# Patient Record
Sex: MALE | URBAN - METROPOLITAN AREA
[De-identification: names, ages, dates, MRNs, and addresses within clinical notes are randomized per-mention and may not be internally consistent; named-entity substitution may affect disease eponyms.]

---

## 2019-10-10 ENCOUNTER — APPOINTMENT (RX ONLY)
Dept: URBAN - METROPOLITAN AREA CLINIC 110 | Facility: CLINIC | Age: 39
Setting detail: DERMATOLOGY
End: 2019-10-10

## 2019-10-10 DIAGNOSIS — B07.8 OTHER VIRAL WARTS: ICD-10-CM

## 2019-10-10 DIAGNOSIS — L82.1 OTHER SEBORRHEIC KERATOSIS: ICD-10-CM

## 2019-10-10 PROBLEM — E13.9 OTHER SPECIFIED DIABETES MELLITUS WITHOUT COMPLICATIONS: Status: ACTIVE | Noted: 2019-10-10

## 2019-10-10 PROBLEM — F41.9 ANXIETY DISORDER, UNSPECIFIED: Status: ACTIVE | Noted: 2019-10-10

## 2019-10-10 PROBLEM — F32.9 MAJOR DEPRESSIVE DISORDER, SINGLE EPISODE, UNSPECIFIED: Status: ACTIVE | Noted: 2019-10-10

## 2019-10-10 PROCEDURE — ? LIQUID NITROGEN (COSMETIC)

## 2019-10-10 PROCEDURE — 99242 OFF/OP CONSLTJ NEW/EST SF 20: CPT | Mod: 25

## 2019-10-10 PROCEDURE — 11301 SHAVE SKIN LESION 0.6-1.0 CM: CPT

## 2019-10-10 PROCEDURE — ? DIAGNOSIS COMMENT

## 2019-10-10 PROCEDURE — ? COUNSELING

## 2019-10-10 PROCEDURE — ? SHAVE REMOVAL

## 2019-10-10 ASSESSMENT — LOCATION DETAILED DESCRIPTION DERM
LOCATION DETAILED: LEFT ANTERIOR PROXIMAL THIGH
LOCATION DETAILED: RIGHT ANTERIOR PROXIMAL THIGH
LOCATION DETAILED: RIGHT ANTERIOR MEDIAL PROXIMAL THIGH
LOCATION DETAILED: GENITALIA

## 2019-10-10 ASSESSMENT — LOCATION SIMPLE DESCRIPTION DERM
LOCATION SIMPLE: RIGHT THIGH
LOCATION SIMPLE: LEFT THIGH
LOCATION SIMPLE: GENITALIA

## 2019-10-10 ASSESSMENT — LOCATION ZONE DERM
LOCATION ZONE: GENITALIA
LOCATION ZONE: LEG

## 2019-10-10 ASSESSMENT — PAIN INTENSITY VAS: HOW INTENSE IS YOUR PAIN 0 BEING NO PAIN, 10 BEING THE MOST SEVERE PAIN POSSIBLE?: NO PAIN

## 2019-10-10 NOTE — PROCEDURE: SHAVE REMOVAL
Path Notes (To The Dermatopathologist): Please check margins.
Add Variable For Additional Medical Justification: No
Consent was obtained from the patient. The risks and benefits to therapy were discussed in detail. Specifically, the risks of infection, scarring, bleeding, prolonged wound healing, incomplete removal, allergy to anesthesia, nerve injury and recurrence were addressed. Prior to the procedure, the treatment site was clearly identified and confirmed by the patient. All components of Universal Protocol/PAUSE Rule completed.
Billing Type: Third-Party Bill
X Size Of Lesion In Cm (Optional): 0
Lab: [id number]
Wound Care: Petrolatum
Medical Necessity Information: It is in your best interest to select a reason for this procedure from the list below. All of these items fulfill various CMS LCD requirements except the new and changing color options.
Was A Bandage Applied: Yes
Anesthesia Type: 1% lidocaine with epinephrine
Biopsy Method: Dermablade
Post-Care Instructions: I reviewed with the patient in detail post-care instructions. Patient is to keep the biopsy site dry overnight, and then apply bacitracin twice daily until healed. Patient may apply hydrogen peroxide soaks to remove any crusting.
Medical Necessity Clause: This procedure was medically necessary because the lesion that was treated was:
Notification Instructions: Patient will be notified of biopsy results. However, patient instructed to call the office if not contacted within 2 weeks.
Hemostasis: Drysol
Size Of Lesion In Cm (Required): 0.7
Detail Level: Detailed

## 2020-01-10 ENCOUNTER — APPOINTMENT (RX ONLY)
Dept: URBAN - METROPOLITAN AREA CLINIC 88 | Facility: CLINIC | Age: 40
Setting detail: DERMATOLOGY
End: 2020-01-10

## 2020-01-10 DIAGNOSIS — A63.0 ANOGENITAL (VENEREAL) WARTS: ICD-10-CM

## 2020-01-10 DIAGNOSIS — D22 MELANOCYTIC NEVI: ICD-10-CM

## 2020-01-10 PROBLEM — D22.4 MELANOCYTIC NEVI OF SCALP AND NECK: Status: ACTIVE | Noted: 2020-01-10

## 2020-01-10 PROCEDURE — 54065 DESTRUCTION PENIS LESION(S): CPT

## 2020-01-10 PROCEDURE — 99202 OFFICE O/P NEW SF 15 MIN: CPT | Mod: 25

## 2020-01-10 PROCEDURE — ? EXTENSIVE DESTRUCTION

## 2020-01-10 PROCEDURE — ? COUNSELING

## 2020-01-10 ASSESSMENT — LOCATION DETAILED DESCRIPTION DERM
LOCATION DETAILED: DORSAL GLANS
LOCATION DETAILED: LEFT ANTERIOR SCROTUM
LOCATION DETAILED: RIGHT ANTERIOR SCROTUM
LOCATION DETAILED: LEFT DORSAL SHAFT OF PENIS
LOCATION DETAILED: LEFT CLAVICULAR NECK

## 2020-01-10 ASSESSMENT — LOCATION SIMPLE DESCRIPTION DERM
LOCATION SIMPLE: LEFT ANTERIOR NECK
LOCATION SIMPLE: SCROTUM
LOCATION SIMPLE: PENIS

## 2020-01-10 ASSESSMENT — LOCATION ZONE DERM
LOCATION ZONE: NECK
LOCATION ZONE: PENIS
LOCATION ZONE: GENITALIA

## 2020-01-10 NOTE — PROCEDURE: EXTENSIVE DESTRUCTION
Method: liquid nitrogen
Post-Care Instructions: I reviewed with the patient in detail post-care instructions. Patient is to wear sunprotection, and avoid picking at any of the treated lesions. Pt may apply Vaseline to crusted or scabbing areas.
Extensive Clause: This destruction was deemed extensive because of the
Anesthesia Volume In Cc: 0.5
Render Post-Care Instructions In Note?: no
Detail Level: Zone
Consent: The patient's consent was obtained including but not limited to risks of crusting, scabbing, blistering, scarring, darker or lighter pigmentary change, recurrence, incomplete removal and infection.

## 2020-01-24 ENCOUNTER — APPOINTMENT (RX ONLY)
Dept: URBAN - METROPOLITAN AREA CLINIC 88 | Facility: CLINIC | Age: 40
Setting detail: DERMATOLOGY
End: 2020-01-24

## 2020-01-24 DIAGNOSIS — A63.0 ANOGENITAL (VENEREAL) WARTS: ICD-10-CM

## 2020-01-24 PROCEDURE — 54065 DESTRUCTION PENIS LESION(S): CPT

## 2020-01-24 PROCEDURE — ? EXTENSIVE DESTRUCTION

## 2020-01-24 PROCEDURE — ? COUNSELING

## 2020-01-24 ASSESSMENT — LOCATION DETAILED DESCRIPTION DERM
LOCATION DETAILED: LEFT DORSAL SHAFT OF PENIS
LOCATION DETAILED: LEFT ANTERIOR SCROTUM
LOCATION DETAILED: RIGHT ANTERIOR SCROTUM
LOCATION DETAILED: DORSAL GLANS

## 2020-01-24 ASSESSMENT — LOCATION ZONE DERM
LOCATION ZONE: PENIS
LOCATION ZONE: GENITALIA

## 2020-01-24 ASSESSMENT — LOCATION SIMPLE DESCRIPTION DERM
LOCATION SIMPLE: PENIS
LOCATION SIMPLE: SCROTUM

## 2020-01-24 NOTE — PROCEDURE: EXTENSIVE DESTRUCTION
Anesthesia Volume In Cc: 0.5
Method: liquid nitrogen
Consent: The patient's consent was obtained including but not limited to risks of crusting, scabbing, blistering, scarring, darker or lighter pigmentary change, recurrence, incomplete removal and infection.
Render Post-Care Instructions In Note?: no
Extensive Clause: This destruction was deemed extensive because of the
Detail Level: Zone
Post-Care Instructions: I reviewed with the patient in detail post-care instructions. Patient is to wear sunprotection, and avoid picking at any of the treated lesions. Pt may apply Vaseline to crusted or scabbing areas.

## 2020-02-14 ENCOUNTER — APPOINTMENT (RX ONLY)
Dept: URBAN - METROPOLITAN AREA CLINIC 88 | Facility: CLINIC | Age: 40
Setting detail: DERMATOLOGY
End: 2020-02-14

## 2020-02-14 DIAGNOSIS — A63.0 ANOGENITAL (VENEREAL) WARTS: ICD-10-CM

## 2020-02-14 PROCEDURE — ? COUNSELING

## 2020-02-14 PROCEDURE — ? EXTENSIVE DESTRUCTION

## 2020-02-14 PROCEDURE — 54065 DESTRUCTION PENIS LESION(S): CPT

## 2020-02-14 ASSESSMENT — LOCATION DETAILED DESCRIPTION DERM
LOCATION DETAILED: RIGHT ANTERIOR SCROTUM
LOCATION DETAILED: LEFT ANTERIOR SCROTUM
LOCATION DETAILED: LEFT DORSAL SHAFT OF PENIS
LOCATION DETAILED: DORSAL GLANS

## 2020-02-14 ASSESSMENT — LOCATION ZONE DERM
LOCATION ZONE: GENITALIA
LOCATION ZONE: PENIS

## 2020-02-14 ASSESSMENT — LOCATION SIMPLE DESCRIPTION DERM
LOCATION SIMPLE: SCROTUM
LOCATION SIMPLE: PENIS

## 2020-02-14 NOTE — PROCEDURE: EXTENSIVE DESTRUCTION
Detail Level: Zone
Anesthesia Volume In Cc: 0.5
Render Post-Care Instructions In Note?: no
Post-Care Instructions: I reviewed with the patient in detail post-care instructions. Patient is to wear sunprotection, and avoid picking at any of the treated lesions. Pt may apply Vaseline to crusted or scabbing areas.
Extensive Clause: This destruction was deemed extensive because of the
Consent: The patient's consent was obtained including but not limited to risks of crusting, scabbing, blistering, scarring, darker or lighter pigmentary change, recurrence, incomplete removal and infection.
Method: liquid nitrogen

## 2020-03-03 ENCOUNTER — APPOINTMENT (RX ONLY)
Dept: URBAN - METROPOLITAN AREA CLINIC 88 | Facility: CLINIC | Age: 40
Setting detail: DERMATOLOGY
End: 2020-03-03

## 2020-03-03 DIAGNOSIS — A63.0 ANOGENITAL (VENEREAL) WARTS: ICD-10-CM

## 2020-03-03 PROCEDURE — ? EXTENSIVE DESTRUCTION

## 2020-03-03 PROCEDURE — ? COUNSELING

## 2020-03-03 PROCEDURE — 54065 DESTRUCTION PENIS LESION(S): CPT

## 2020-03-03 ASSESSMENT — LOCATION DETAILED DESCRIPTION DERM
LOCATION DETAILED: LEFT ANTERIOR SCROTUM
LOCATION DETAILED: LEFT DORSAL SHAFT OF PENIS
LOCATION DETAILED: RIGHT ANTERIOR SCROTUM
LOCATION DETAILED: DORSAL GLANS

## 2020-03-03 ASSESSMENT — LOCATION ZONE DERM
LOCATION ZONE: PENIS
LOCATION ZONE: GENITALIA

## 2020-03-03 ASSESSMENT — LOCATION SIMPLE DESCRIPTION DERM
LOCATION SIMPLE: SCROTUM
LOCATION SIMPLE: PENIS

## 2020-03-03 NOTE — PROCEDURE: EXTENSIVE DESTRUCTION
Consent: The patient's consent was obtained including but not limited to risks of crusting, scabbing, blistering, scarring, darker or lighter pigmentary change, recurrence, incomplete removal and infection.
Anesthesia Volume In Cc: 0.5
Render Post-Care Instructions In Note?: no
Method: liquid nitrogen
Detail Level: Zone
Post-Care Instructions: I reviewed with the patient in detail post-care instructions. Patient is to wear sunprotection, and avoid picking at any of the treated lesions. Pt may apply Vaseline to crusted or scabbing areas.
Extensive Clause: This destruction was deemed extensive because of the

## 2020-04-28 ENCOUNTER — APPOINTMENT (RX ONLY)
Dept: URBAN - METROPOLITAN AREA CLINIC 88 | Facility: CLINIC | Age: 40
Setting detail: DERMATOLOGY
End: 2020-04-28

## 2020-04-28 DIAGNOSIS — A63.0 ANOGENITAL (VENEREAL) WARTS: ICD-10-CM

## 2020-04-28 PROCEDURE — 54065 DESTRUCTION PENIS LESION(S): CPT

## 2020-04-28 PROCEDURE — ? COUNSELING

## 2020-04-28 PROCEDURE — ? EXTENSIVE DESTRUCTION

## 2020-04-28 ASSESSMENT — LOCATION SIMPLE DESCRIPTION DERM
LOCATION SIMPLE: SCROTUM
LOCATION SIMPLE: PENIS

## 2020-04-28 ASSESSMENT — LOCATION DETAILED DESCRIPTION DERM
LOCATION DETAILED: RIGHT ANTERIOR SCROTUM
LOCATION DETAILED: DORSAL GLANS
LOCATION DETAILED: LEFT ANTERIOR SCROTUM
LOCATION DETAILED: LEFT DORSAL SHAFT OF PENIS

## 2020-04-28 ASSESSMENT — LOCATION ZONE DERM
LOCATION ZONE: PENIS
LOCATION ZONE: GENITALIA

## 2020-04-28 NOTE — PROCEDURE: EXTENSIVE DESTRUCTION
Extensive Clause: This destruction was deemed extensive because of the
Render Post-Care Instructions In Note?: no
Detail Level: Zone
Anesthesia Volume In Cc: 0.5
Consent: The patient's consent was obtained including but not limited to risks of crusting, scabbing, blistering, scarring, darker or lighter pigmentary change, recurrence, incomplete removal and infection.
Post-Care Instructions: I reviewed with the patient in detail post-care instructions. Patient is to wear sunprotection, and avoid picking at any of the treated lesions. Pt may apply Vaseline to crusted or scabbing areas.
Method: liquid nitrogen

## 2020-05-12 ENCOUNTER — APPOINTMENT (RX ONLY)
Dept: URBAN - METROPOLITAN AREA CLINIC 88 | Facility: CLINIC | Age: 40
Setting detail: DERMATOLOGY
End: 2020-05-12

## 2020-05-12 DIAGNOSIS — A63.0 ANOGENITAL (VENEREAL) WARTS: ICD-10-CM

## 2020-05-12 PROCEDURE — ? EXTENSIVE DESTRUCTION

## 2020-05-12 PROCEDURE — ? COUNSELING

## 2020-05-12 PROCEDURE — 54065 DESTRUCTION PENIS LESION(S): CPT

## 2020-05-12 ASSESSMENT — LOCATION DETAILED DESCRIPTION DERM
LOCATION DETAILED: RIGHT ANTERIOR SCROTUM
LOCATION DETAILED: LEFT ANTERIOR SCROTUM
LOCATION DETAILED: DORSAL GLANS
LOCATION DETAILED: LEFT DORSAL SHAFT OF PENIS

## 2020-05-12 ASSESSMENT — LOCATION ZONE DERM
LOCATION ZONE: GENITALIA
LOCATION ZONE: PENIS

## 2020-05-12 ASSESSMENT — LOCATION SIMPLE DESCRIPTION DERM
LOCATION SIMPLE: SCROTUM
LOCATION SIMPLE: PENIS

## 2020-05-12 NOTE — PROCEDURE: EXTENSIVE DESTRUCTION
Consent: The patient's consent was obtained including but not limited to risks of crusting, scabbing, blistering, scarring, darker or lighter pigmentary change, recurrence, incomplete removal and infection.
Method: liquid nitrogen
Anesthesia Volume In Cc: 0.5
Extensive Clause: This destruction was deemed extensive because of the
Post-Care Instructions: I reviewed with the patient in detail post-care instructions. Patient is to wear sunprotection, and avoid picking at any of the treated lesions. Pt may apply Vaseline to crusted or scabbing areas.
Render Post-Care Instructions In Note?: no
Detail Level: Zone

## 2020-05-26 ENCOUNTER — APPOINTMENT (RX ONLY)
Dept: URBAN - METROPOLITAN AREA CLINIC 88 | Facility: CLINIC | Age: 40
Setting detail: DERMATOLOGY
End: 2020-05-26

## 2020-05-26 DIAGNOSIS — A63.0 ANOGENITAL (VENEREAL) WARTS: ICD-10-CM

## 2020-05-26 PROCEDURE — ? EXTENSIVE DESTRUCTION

## 2020-05-26 PROCEDURE — ? COUNSELING

## 2020-05-26 PROCEDURE — 54065 DESTRUCTION PENIS LESION(S): CPT

## 2020-05-26 ASSESSMENT — LOCATION ZONE DERM
LOCATION ZONE: PENIS
LOCATION ZONE: GENITALIA

## 2020-05-26 ASSESSMENT — LOCATION DETAILED DESCRIPTION DERM
LOCATION DETAILED: LEFT DORSAL SHAFT OF PENIS
LOCATION DETAILED: DORSAL GLANS
LOCATION DETAILED: LEFT ANTERIOR SCROTUM
LOCATION DETAILED: RIGHT ANTERIOR SCROTUM

## 2020-05-26 ASSESSMENT — LOCATION SIMPLE DESCRIPTION DERM
LOCATION SIMPLE: SCROTUM
LOCATION SIMPLE: PENIS

## 2020-05-26 NOTE — PROCEDURE: EXTENSIVE DESTRUCTION
Consent: The patient's consent was obtained including but not limited to risks of crusting, scabbing, blistering, scarring, darker or lighter pigmentary change, recurrence, incomplete removal and infection.
Render Post-Care Instructions In Note?: no
Post-Care Instructions: I reviewed with the patient in detail post-care instructions. Patient is to wear sunprotection, and avoid picking at any of the treated lesions. Pt may apply Vaseline to crusted or scabbing areas.
Anesthesia Volume In Cc: 0.5
Method: liquid nitrogen
Extensive Clause: This destruction was deemed extensive because of the
Detail Level: Zone

## 2020-06-09 ENCOUNTER — APPOINTMENT (RX ONLY)
Dept: URBAN - METROPOLITAN AREA CLINIC 88 | Facility: CLINIC | Age: 40
Setting detail: DERMATOLOGY
End: 2020-06-09

## 2020-06-09 DIAGNOSIS — A63.0 ANOGENITAL (VENEREAL) WARTS: ICD-10-CM

## 2020-06-09 PROCEDURE — ? EXTENSIVE DESTRUCTION

## 2020-06-09 PROCEDURE — 54065 DESTRUCTION PENIS LESION(S): CPT

## 2020-06-09 PROCEDURE — ? COUNSELING

## 2020-06-09 ASSESSMENT — LOCATION DETAILED DESCRIPTION DERM
LOCATION DETAILED: LEFT ANTERIOR SCROTUM
LOCATION DETAILED: DORSAL GLANS
LOCATION DETAILED: LEFT DORSAL SHAFT OF PENIS
LOCATION DETAILED: RIGHT ANTERIOR SCROTUM

## 2020-06-09 ASSESSMENT — LOCATION ZONE DERM
LOCATION ZONE: GENITALIA
LOCATION ZONE: PENIS

## 2020-06-09 ASSESSMENT — LOCATION SIMPLE DESCRIPTION DERM
LOCATION SIMPLE: SCROTUM
LOCATION SIMPLE: PENIS

## 2020-06-09 NOTE — PROCEDURE: EXTENSIVE DESTRUCTION
Consent: The patient's consent was obtained including but not limited to risks of crusting, scabbing, blistering, scarring, darker or lighter pigmentary change, recurrence, incomplete removal and infection.
Anesthesia Volume In Cc: 0.5
Render Post-Care Instructions In Note?: no
Detail Level: Zone
Extensive Clause: This destruction was deemed extensive because of the
Method: liquid nitrogen
Post-Care Instructions: I reviewed with the patient in detail post-care instructions. Patient is to wear sunprotection, and avoid picking at any of the treated lesions. Pt may apply Vaseline to crusted or scabbing areas.

## 2020-06-23 ENCOUNTER — APPOINTMENT (RX ONLY)
Dept: URBAN - METROPOLITAN AREA CLINIC 88 | Facility: CLINIC | Age: 40
Setting detail: DERMATOLOGY
End: 2020-06-23

## 2020-06-23 DIAGNOSIS — A63.0 ANOGENITAL (VENEREAL) WARTS: ICD-10-CM

## 2020-06-23 PROCEDURE — ? COUNSELING

## 2020-06-23 PROCEDURE — ? EXTENSIVE DESTRUCTION

## 2020-06-23 PROCEDURE — 54065 DESTRUCTION PENIS LESION(S): CPT

## 2020-06-23 ASSESSMENT — LOCATION SIMPLE DESCRIPTION DERM
LOCATION SIMPLE: SCROTUM
LOCATION SIMPLE: PENIS

## 2020-06-23 ASSESSMENT — LOCATION ZONE DERM
LOCATION ZONE: GENITALIA
LOCATION ZONE: PENIS

## 2020-06-23 ASSESSMENT — LOCATION DETAILED DESCRIPTION DERM
LOCATION DETAILED: LEFT DORSAL SHAFT OF PENIS
LOCATION DETAILED: DORSAL GLANS
LOCATION DETAILED: RIGHT ANTERIOR SCROTUM
LOCATION DETAILED: LEFT ANTERIOR SCROTUM

## 2020-06-23 NOTE — PROCEDURE: EXTENSIVE DESTRUCTION
Render Post-Care Instructions In Note?: no
Detail Level: Zone
Method: liquid nitrogen
Extensive Clause: This destruction was deemed extensive because of the
Post-Care Instructions: I reviewed with the patient in detail post-care instructions. Patient is to wear sunprotection, and avoid picking at any of the treated lesions. Pt may apply Vaseline to crusted or scabbing areas.
Consent: The patient's consent was obtained including but not limited to risks of crusting, scabbing, blistering, scarring, darker or lighter pigmentary change, recurrence, incomplete removal and infection.
Anesthesia Volume In Cc: 0.5

## 2020-07-09 ENCOUNTER — APPOINTMENT (RX ONLY)
Dept: URBAN - METROPOLITAN AREA CLINIC 88 | Facility: CLINIC | Age: 40
Setting detail: DERMATOLOGY
End: 2020-07-09

## 2020-07-09 DIAGNOSIS — A63.0 ANOGENITAL (VENEREAL) WARTS: ICD-10-CM

## 2020-07-09 PROCEDURE — ? EXTENSIVE DESTRUCTION

## 2020-07-09 PROCEDURE — ? COUNSELING

## 2020-07-09 PROCEDURE — 54065 DESTRUCTION PENIS LESION(S): CPT

## 2020-07-09 ASSESSMENT — LOCATION SIMPLE DESCRIPTION DERM
LOCATION SIMPLE: SCROTUM
LOCATION SIMPLE: PENIS

## 2020-07-09 ASSESSMENT — LOCATION ZONE DERM
LOCATION ZONE: GENITALIA
LOCATION ZONE: PENIS

## 2020-07-09 NOTE — PROCEDURE: EXTENSIVE DESTRUCTION
Post-Care Instructions: I reviewed with the patient in detail post-care instructions. Patient is to wear sunprotection, and avoid picking at any of the treated lesions. Pt may apply Vaseline to crusted or scabbing areas.
Consent: The patient's consent was obtained including but not limited to risks of crusting, scabbing, blistering, scarring, darker or lighter pigmentary change, recurrence, incomplete removal and infection.
Method: liquid nitrogen
Render Post-Care Instructions In Note?: no
Anesthesia Volume In Cc: 0.5
Detail Level: Zone
Extensive Clause: This destruction was deemed extensive because of the

## 2020-07-22 ENCOUNTER — APPOINTMENT (RX ONLY)
Dept: URBAN - METROPOLITAN AREA CLINIC 87 | Facility: CLINIC | Age: 40
Setting detail: DERMATOLOGY
End: 2020-07-22

## 2020-07-22 DIAGNOSIS — A63.0 ANOGENITAL (VENEREAL) WARTS: ICD-10-CM

## 2020-07-22 PROCEDURE — ? EXTENSIVE DESTRUCTION

## 2020-07-22 PROCEDURE — ? COUNSELING

## 2020-07-22 PROCEDURE — 54065 DESTRUCTION PENIS LESION(S): CPT

## 2020-07-22 ASSESSMENT — LOCATION DETAILED DESCRIPTION DERM
LOCATION DETAILED: DORSAL GLANS
LOCATION DETAILED: LEFT DORSAL SHAFT OF PENIS
LOCATION DETAILED: LEFT ANTERIOR SCROTUM
LOCATION DETAILED: RIGHT ANTERIOR SCROTUM

## 2020-07-22 ASSESSMENT — LOCATION SIMPLE DESCRIPTION DERM
LOCATION SIMPLE: SCROTUM
LOCATION SIMPLE: PENIS

## 2020-07-22 ASSESSMENT — LOCATION ZONE DERM
LOCATION ZONE: GENITALIA
LOCATION ZONE: PENIS

## 2020-08-12 ENCOUNTER — APPOINTMENT (RX ONLY)
Dept: URBAN - METROPOLITAN AREA CLINIC 87 | Facility: CLINIC | Age: 40
Setting detail: DERMATOLOGY
End: 2020-08-12

## 2020-08-12 DIAGNOSIS — A63.0 ANOGENITAL (VENEREAL) WARTS: ICD-10-CM

## 2020-08-12 PROCEDURE — ? EXTENSIVE DESTRUCTION

## 2020-08-12 PROCEDURE — 54065 DESTRUCTION PENIS LESION(S): CPT

## 2020-08-12 ASSESSMENT — LOCATION DETAILED DESCRIPTION DERM
LOCATION DETAILED: DORSAL GLANS
LOCATION DETAILED: RIGHT ANTERIOR SCROTUM
LOCATION DETAILED: LEFT DORSAL SHAFT OF PENIS
LOCATION DETAILED: LEFT ANTERIOR SCROTUM

## 2020-08-12 ASSESSMENT — LOCATION ZONE DERM
LOCATION ZONE: GENITALIA
LOCATION ZONE: PENIS

## 2020-08-12 ASSESSMENT — LOCATION SIMPLE DESCRIPTION DERM
LOCATION SIMPLE: PENIS
LOCATION SIMPLE: SCROTUM

## 2020-08-12 NOTE — PROCEDURE: EXTENSIVE DESTRUCTION
Method: liquid nitrogen
Extensive Clause: This destruction was deemed extensive because of the
Post-Care Instructions: I reviewed with the patient in detail post-care instructions. Patient is to wear sunprotection, and avoid picking at any of the treated lesions. Pt may apply Vaseline to crusted or scabbing areas.
Consent: The patient's consent was obtained including but not limited to risks of crusting, scabbing, blistering, scarring, darker or lighter pigmentary change, recurrence, incomplete removal and infection.
Anesthesia Volume In Cc: 0.5
Render Post-Care Instructions In Note?: no
Detail Level: Zone

## 2020-09-02 ENCOUNTER — APPOINTMENT (RX ONLY)
Dept: URBAN - METROPOLITAN AREA CLINIC 87 | Facility: CLINIC | Age: 40
Setting detail: DERMATOLOGY
End: 2020-09-02

## 2020-09-02 DIAGNOSIS — A63.0 ANOGENITAL (VENEREAL) WARTS: ICD-10-CM

## 2020-09-02 PROCEDURE — ? EXTENSIVE DESTRUCTION

## 2020-09-02 PROCEDURE — ? ADDITIONAL NOTES

## 2020-09-02 PROCEDURE — 54065 DESTRUCTION PENIS LESION(S): CPT

## 2020-09-02 ASSESSMENT — LOCATION ZONE DERM
LOCATION ZONE: PENIS
LOCATION ZONE: GENITALIA

## 2020-09-02 ASSESSMENT — LOCATION SIMPLE DESCRIPTION DERM
LOCATION SIMPLE: SCROTUM
LOCATION SIMPLE: PENIS

## 2020-09-02 NOTE — PROCEDURE: EXTENSIVE DESTRUCTION
Detail Level: Zone
Method: liquid nitrogen
Post-Care Instructions: I reviewed with the patient in detail post-care instructions. Patient is to wear sunprotection, and avoid picking at any of the treated lesions. Pt may apply Vaseline to crusted or scabbing areas.
Extensive Clause: This destruction was deemed extensive because of the
Consent: The patient's consent was obtained including but not limited to risks of crusting, scabbing, blistering, scarring, darker or lighter pigmentary change, recurrence, incomplete removal and infection.
Anesthesia Volume In Cc: 0.5
Render Post-Care Instructions In Note?: no

## 2020-09-16 ENCOUNTER — APPOINTMENT (RX ONLY)
Dept: URBAN - METROPOLITAN AREA CLINIC 87 | Facility: CLINIC | Age: 40
Setting detail: DERMATOLOGY
End: 2020-09-16

## 2020-09-16 DIAGNOSIS — A63.0 ANOGENITAL (VENEREAL) WARTS: ICD-10-CM | Status: RESOLVING

## 2020-09-16 PROCEDURE — ? ADDITIONAL NOTES

## 2020-09-16 PROCEDURE — ? EXTENSIVE DESTRUCTION

## 2020-09-16 PROCEDURE — 54065 DESTRUCTION PENIS LESION(S): CPT

## 2020-09-16 ASSESSMENT — LOCATION DETAILED DESCRIPTION DERM
LOCATION DETAILED: LEFT ANTERIOR SCROTUM
LOCATION DETAILED: RIGHT ANTERIOR SCROTUM
LOCATION DETAILED: DORSAL GLANS
LOCATION DETAILED: LEFT DORSAL SHAFT OF PENIS

## 2020-09-16 ASSESSMENT — LOCATION ZONE DERM
LOCATION ZONE: GENITALIA
LOCATION ZONE: PENIS

## 2020-09-16 ASSESSMENT — LOCATION SIMPLE DESCRIPTION DERM
LOCATION SIMPLE: PENIS
LOCATION SIMPLE: SCROTUM

## 2020-09-16 NOTE — PROCEDURE: EXTENSIVE DESTRUCTION
Detail Level: Zone
Extensive Clause: This destruction was deemed extensive because of the
Method: liquid nitrogen
Post-Care Instructions: I reviewed with the patient in detail post-care instructions. Patient is to wear sunprotection, and avoid picking at any of the treated lesions. Pt may apply Vaseline to crusted or scabbing areas.
Consent: The patient's consent was obtained including but not limited to risks of crusting, scabbing, blistering, scarring, darker or lighter pigmentary change, recurrence, incomplete removal and infection.
Anesthesia Volume In Cc: 0.5
Render Post-Care Instructions In Note?: no

## 2020-10-28 ENCOUNTER — APPOINTMENT (RX ONLY)
Dept: URBAN - METROPOLITAN AREA CLINIC 87 | Facility: CLINIC | Age: 40
Setting detail: DERMATOLOGY
End: 2020-10-28

## 2020-10-28 DIAGNOSIS — A63.0 ANOGENITAL (VENEREAL) WARTS: ICD-10-CM | Status: RESOLVING

## 2020-10-28 PROCEDURE — ? EXTENSIVE DESTRUCTION

## 2020-10-28 PROCEDURE — ? ADDITIONAL NOTES

## 2020-10-28 PROCEDURE — 54065 DESTRUCTION PENIS LESION(S): CPT

## 2020-10-28 ASSESSMENT — LOCATION SIMPLE DESCRIPTION DERM
LOCATION SIMPLE: SCROTUM
LOCATION SIMPLE: PENIS

## 2020-10-28 ASSESSMENT — LOCATION ZONE DERM
LOCATION ZONE: GENITALIA
LOCATION ZONE: PENIS

## 2020-10-28 NOTE — PROCEDURE: EXTENSIVE DESTRUCTION
Anesthesia Volume In Cc: 0.5
Render Post-Care Instructions In Note?: no
Detail Level: Zone
Extensive Clause: This destruction was deemed extensive because of the
Method: liquid nitrogen
Post-Care Instructions: I reviewed with the patient in detail post-care instructions. Patient is to wear sunprotection, and avoid picking at any of the treated lesions. Pt may apply Vaseline to crusted or scabbing areas.
Consent: The patient's consent was obtained including but not limited to risks of crusting, scabbing, blistering, scarring, darker or lighter pigmentary change, recurrence, incomplete removal and infection.

## 2020-10-28 NOTE — PROCEDURE: ADDITIONAL NOTES
Detail Level: Simple
Additional Notes: Has been undergoing treatment since Jan 2020 Kyra stated 85% is gone. When warts are gone completely, send Rx cream to help

## 2020-11-11 ENCOUNTER — APPOINTMENT (RX ONLY)
Dept: URBAN - METROPOLITAN AREA CLINIC 87 | Facility: CLINIC | Age: 40
Setting detail: DERMATOLOGY
End: 2020-11-11

## 2020-11-11 DIAGNOSIS — A63.0 ANOGENITAL (VENEREAL) WARTS: ICD-10-CM | Status: RESOLVING

## 2020-11-11 PROCEDURE — ? ADDITIONAL NOTES

## 2020-11-11 PROCEDURE — ? EXTENSIVE DESTRUCTION

## 2020-11-11 PROCEDURE — 54065 DESTRUCTION PENIS LESION(S): CPT

## 2020-11-11 ASSESSMENT — LOCATION DETAILED DESCRIPTION DERM
LOCATION DETAILED: DORSAL GLANS
LOCATION DETAILED: LEFT DORSAL SHAFT OF PENIS
LOCATION DETAILED: RIGHT ANTERIOR SCROTUM
LOCATION DETAILED: LEFT ANTERIOR SCROTUM

## 2020-11-11 ASSESSMENT — LOCATION SIMPLE DESCRIPTION DERM
LOCATION SIMPLE: SCROTUM
LOCATION SIMPLE: PENIS

## 2020-11-11 ASSESSMENT — LOCATION ZONE DERM
LOCATION ZONE: GENITALIA
LOCATION ZONE: PENIS

## 2020-12-02 ENCOUNTER — APPOINTMENT (RX ONLY)
Dept: URBAN - METROPOLITAN AREA CLINIC 87 | Facility: CLINIC | Age: 40
Setting detail: DERMATOLOGY
End: 2020-12-02

## 2020-12-02 DIAGNOSIS — A63.0 ANOGENITAL (VENEREAL) WARTS: ICD-10-CM

## 2020-12-02 PROCEDURE — ? TREATMENT REGIMEN

## 2020-12-02 PROCEDURE — ? ADDITIONAL NOTES

## 2020-12-02 PROCEDURE — 54065 DESTRUCTION PENIS LESION(S): CPT

## 2020-12-02 PROCEDURE — ? EXTENSIVE DESTRUCTION

## 2020-12-02 ASSESSMENT — LOCATION SIMPLE DESCRIPTION DERM
LOCATION SIMPLE: SCROTUM
LOCATION SIMPLE: PENIS

## 2020-12-02 ASSESSMENT — LOCATION DETAILED DESCRIPTION DERM
LOCATION DETAILED: DORSAL GLANS
LOCATION DETAILED: LEFT ANTERIOR SCROTUM
LOCATION DETAILED: LEFT DORSAL SHAFT OF PENIS
LOCATION DETAILED: RIGHT ANTERIOR SCROTUM

## 2020-12-02 ASSESSMENT — LOCATION ZONE DERM
LOCATION ZONE: PENIS
LOCATION ZONE: GENITALIA

## 2020-12-02 NOTE — PROCEDURE: EXTENSIVE DESTRUCTION
Consent: The patient's consent was obtained including but not limited to risks of crusting, scabbing, blistering, scarring, darker or lighter pigmentary change, recurrence, incomplete removal and infection.
Anesthesia Volume In Cc: 0.5
Render Post-Care Instructions In Note?: no
Detail Level: Zone
Method: liquid nitrogen
Extensive Clause: This destruction was deemed extensive because of the
Post-Care Instructions: I reviewed with the patient in detail post-care instructions. Patient is to wear sunprotection, and avoid picking at any of the treated lesions. Pt may apply Vaseline to crusted or scabbing areas.

## 2020-12-02 NOTE — PROCEDURE: ADDITIONAL NOTES
Detail Level: Simple
Additional Notes: Has been undergoing treatment since Jan 2020. Today is 17th visit. Lesion still present today but improved.When warts are completely clear we will send Rx cream to help

## 2020-12-16 ENCOUNTER — APPOINTMENT (RX ONLY)
Dept: URBAN - METROPOLITAN AREA CLINIC 87 | Facility: CLINIC | Age: 40
Setting detail: DERMATOLOGY
End: 2020-12-16

## 2020-12-16 DIAGNOSIS — A63.0 ANOGENITAL (VENEREAL) WARTS: ICD-10-CM

## 2020-12-16 PROCEDURE — ? EXTENSIVE DESTRUCTION

## 2020-12-16 PROCEDURE — ? TREATMENT REGIMEN

## 2020-12-16 PROCEDURE — ? ADDITIONAL NOTES

## 2020-12-16 PROCEDURE — 54065 DESTRUCTION PENIS LESION(S): CPT

## 2020-12-16 ASSESSMENT — LOCATION ZONE DERM
LOCATION ZONE: GENITALIA
LOCATION ZONE: PENIS

## 2020-12-16 ASSESSMENT — LOCATION DETAILED DESCRIPTION DERM
LOCATION DETAILED: LEFT ANTERIOR SCROTUM
LOCATION DETAILED: RIGHT ANTERIOR SCROTUM
LOCATION DETAILED: LEFT DORSAL SHAFT OF PENIS
LOCATION DETAILED: DORSAL GLANS

## 2020-12-16 ASSESSMENT — LOCATION SIMPLE DESCRIPTION DERM
LOCATION SIMPLE: PENIS
LOCATION SIMPLE: SCROTUM

## 2020-12-16 NOTE — PROCEDURE: ADDITIONAL NOTES
Detail Level: Simple
Additional Notes: Has been undergoing treatment since Jan 2020. Today is 18th visit. Lesion still present today but improved.When warts are completely clear we will send Rx cream to help. The left side is nearly clear

## 2020-12-16 NOTE — PROCEDURE: EXTENSIVE DESTRUCTION
Detail Level: Zone
Extensive Clause: This destruction was deemed extensive because of the
Anesthesia Volume In Cc: 0.5
Method: liquid nitrogen
Render Post-Care Instructions In Note?: no
Post-Care Instructions: I reviewed with the patient in detail post-care instructions. Patient is to wear sunprotection, and avoid picking at any of the treated lesions. Pt may apply Vaseline to crusted or scabbing areas.
Consent: The patient's consent was obtained including but not limited to risks of crusting, scabbing, blistering, scarring, darker or lighter pigmentary change, recurrence, incomplete removal and infection.

## 2020-12-30 ENCOUNTER — APPOINTMENT (RX ONLY)
Dept: URBAN - METROPOLITAN AREA CLINIC 87 | Facility: CLINIC | Age: 40
Setting detail: DERMATOLOGY
End: 2020-12-30

## 2020-12-30 DIAGNOSIS — A63.0 ANOGENITAL (VENEREAL) WARTS: ICD-10-CM

## 2020-12-30 PROCEDURE — ? EXTENSIVE DESTRUCTION

## 2020-12-30 PROCEDURE — ? TREATMENT REGIMEN

## 2020-12-30 PROCEDURE — 54065 DESTRUCTION PENIS LESION(S): CPT

## 2020-12-30 PROCEDURE — ? PRESCRIPTION

## 2020-12-30 PROCEDURE — ? ADDITIONAL NOTES

## 2020-12-30 RX ORDER — IMIQUIMOD 37.5 MG/G
CREAM TOPICAL
Qty: 1 | Refills: 0 | Status: ERX | COMMUNITY
Start: 2020-12-30

## 2020-12-30 RX ADMIN — IMIQUIMOD: 37.5 CREAM TOPICAL at 00:00

## 2020-12-30 ASSESSMENT — LOCATION SIMPLE DESCRIPTION DERM
LOCATION SIMPLE: SCROTUM
LOCATION SIMPLE: PENIS

## 2020-12-30 ASSESSMENT — LOCATION ZONE DERM
LOCATION ZONE: PENIS
LOCATION ZONE: GENITALIA

## 2020-12-30 NOTE — PROCEDURE: ADDITIONAL NOTES
Detail Level: Simple
Additional Notes: Has been undergoing treatment since Jan 2020. Today is 19th visit. Lesion still present today but improved.When warts are completely clear we will send Rx cream to help. The left side is nearly clear

## 2020-12-30 NOTE — PROCEDURE: EXTENSIVE DESTRUCTION
Anesthesia Volume In Cc: 0.5
Post-Care Instructions: I reviewed with the patient in detail post-care instructions. Patient is to wear sunprotection, and avoid picking at any of the treated lesions. Pt may apply Vaseline to crusted or scabbing areas.
Render Post-Care Instructions In Note?: no
Method: liquid nitrogen
Detail Level: Zone
Extensive Clause: This destruction was deemed extensive because of the
Consent: The patient's consent was obtained including but not limited to risks of crusting, scabbing, blistering, scarring, darker or lighter pigmentary change, recurrence, incomplete removal and infection.

## 2021-02-02 ENCOUNTER — APPOINTMENT (RX ONLY)
Dept: URBAN - METROPOLITAN AREA CLINIC 87 | Facility: CLINIC | Age: 41
Setting detail: DERMATOLOGY
End: 2021-02-02

## 2021-02-02 ENCOUNTER — RX ONLY (OUTPATIENT)
Age: 41
Setting detail: RX ONLY
End: 2021-02-02

## 2021-02-02 DIAGNOSIS — A63.0 ANOGENITAL (VENEREAL) WARTS: ICD-10-CM

## 2021-02-02 PROCEDURE — ? TREATMENT REGIMEN

## 2021-02-02 PROCEDURE — ? LIQUID NITROGEN GENITALS MULTI

## 2021-02-02 PROCEDURE — ? PRESCRIPTION

## 2021-02-02 PROCEDURE — 17110 DESTRUCTION B9 LES UP TO 14: CPT

## 2021-02-02 RX ORDER — IMIQUIMOD 50 MG/G
CREAM TOPICAL
Qty: 1 | Refills: 1 | Status: ERX

## 2021-02-02 RX ORDER — IMIQUIMOD 50 MG/G
CREAM TOPICAL
Qty: 1 | Refills: 1 | Status: ERX | COMMUNITY
Start: 2021-02-02

## 2021-02-02 RX ADMIN — IMIQUIMOD: 50 CREAM TOPICAL at 00:00

## 2021-02-02 ASSESSMENT — LOCATION SIMPLE DESCRIPTION DERM: LOCATION SIMPLE: GROIN

## 2021-02-02 ASSESSMENT — LOCATION DETAILED DESCRIPTION DERM
LOCATION DETAILED: SUPRAPUBIC SKIN
LOCATION DETAILED: RIGHT INGUINAL CREASE

## 2021-02-02 ASSESSMENT — LOCATION ZONE DERM: LOCATION ZONE: TRUNK

## 2021-02-02 NOTE — PROCEDURE: TREATMENT REGIMEN
Initiate Treatment: Imiquimod qod x 2 weeks. Pt was warned that the imiquimod can sometimes cause a strong inflammatory reaction.
Detail Level: Zone

## 2021-02-02 NOTE — PROCEDURE: LIQUID NITROGEN GENITALS MULTI
Render Post-Care Instructions In Note?: no
Detail Level: Zone
Consent: The patient's consent was obtained including but not limited to risks of crusting, scabbing, blistering, scarring, darker or lighter pigmentary change, recurrence, incomplete removal and infection.
Post-Care Instructions: I reviewed with the patient in detail post-care instructions. Patient is to wear sunprotection, and avoid picking at any of the treated lesions. Pt may apply Vaseline to crusted or scabbing areas.
Total Number Of Lesions Treated: 6

## 2021-03-08 ENCOUNTER — APPOINTMENT (RX ONLY)
Dept: URBAN - METROPOLITAN AREA CLINIC 87 | Facility: CLINIC | Age: 41
Setting detail: DERMATOLOGY
End: 2021-03-08

## 2021-03-08 DIAGNOSIS — A63.0 ANOGENITAL (VENEREAL) WARTS: ICD-10-CM | Status: RESOLVED

## 2021-03-08 PROCEDURE — ? LIQUID NITROGEN GENITALS MULTI

## 2021-03-08 PROCEDURE — ? COUNSELING

## 2021-03-08 PROCEDURE — 17110 DESTRUCTION B9 LES UP TO 14: CPT

## 2021-03-08 PROCEDURE — ? TREATMENT REGIMEN

## 2021-03-08 PROCEDURE — ? PRESCRIPTION

## 2021-03-08 RX ORDER — IMIQUIMOD 50 MG/G
CREAM TOPICAL
Qty: 1 | Refills: 5 | Status: ERX

## 2021-03-08 ASSESSMENT — LOCATION ZONE DERM: LOCATION ZONE: TRUNK

## 2021-03-08 ASSESSMENT — LOCATION DETAILED DESCRIPTION DERM
LOCATION DETAILED: SUPRAPUBIC SKIN
LOCATION DETAILED: RIGHT INGUINAL CREASE

## 2021-03-08 ASSESSMENT — LOCATION SIMPLE DESCRIPTION DERM: LOCATION SIMPLE: GROIN

## 2021-03-08 NOTE — PROCEDURE: LIQUID NITROGEN GENITALS MULTI
Total Number Of Lesions Treated: 2
Detail Level: Zone
Post-Care Instructions: I reviewed with the patient in detail post-care instructions. Patient is to wear sunprotection, and avoid picking at any of the treated lesions. Pt may apply Vaseline to crusted or scabbing areas.
Consent: The patient's consent was obtained including but not limited to risks of crusting, scabbing, blistering, scarring, darker or lighter pigmentary change, recurrence, incomplete removal and infection.
Render Post-Care Instructions In Note?: no

## 2021-03-08 NOTE — PROCEDURE: TREATMENT REGIMEN
Detail Level: Zone
Continue Regimen: Imiquimod qod x 2 weeks as needed for any new flares. Pt understood.
Plan: Pt is doing very well. I suggested that he use the imiquimod cream for suspicious lesions if any come up. Pt can return if lesions do not resolve with the cream.

## 2023-03-23 ENCOUNTER — APPOINTMENT (RX ONLY)
Dept: URBAN - METROPOLITAN AREA CLINIC 86 | Facility: CLINIC | Age: 43
Setting detail: DERMATOLOGY
End: 2023-03-23

## 2023-03-23 DIAGNOSIS — A63.0 ANOGENITAL (VENEREAL) WARTS: ICD-10-CM

## 2023-03-23 PROCEDURE — ? PRESCRIPTION

## 2023-03-23 PROCEDURE — ? TREATMENT REGIMEN

## 2023-03-23 PROCEDURE — ? LIQUID NITROGEN GENITALS

## 2023-03-23 PROCEDURE — ? COUNSELING

## 2023-03-23 PROCEDURE — 17110 DESTRUCTION B9 LES UP TO 14: CPT

## 2023-03-23 RX ORDER — IMIQUIMOD 50 MG/G
CREAM TOPICAL
Qty: 1 | Refills: 5 | Status: ERX | COMMUNITY
Start: 2023-03-23

## 2023-03-23 RX ADMIN — IMIQUIMOD: 50 CREAM TOPICAL at 00:00

## 2023-03-23 ASSESSMENT — LOCATION ZONE DERM
LOCATION ZONE: LEG
LOCATION ZONE: TRUNK
LOCATION ZONE: GENITALIA

## 2023-03-23 ASSESSMENT — LOCATION SIMPLE DESCRIPTION DERM
LOCATION SIMPLE: SCROTUM
LOCATION SIMPLE: GROIN
LOCATION SIMPLE: RIGHT THIGH

## 2023-03-23 ASSESSMENT — LOCATION DETAILED DESCRIPTION DERM
LOCATION DETAILED: RIGHT ANTERIOR PROXIMAL THIGH
LOCATION DETAILED: SUPRAPUBIC SKIN
LOCATION DETAILED: RIGHT ANTERIOR SCROTUM

## 2023-03-23 NOTE — PROCEDURE: TREATMENT REGIMEN
Detail Level: Zone
Continue Regimen: Imiquimod qod x 2 weeks as needed for any new flares. Pt understood.
Plan: Alternatively, pt can try veregen cream qd x 2 weeks. Also discussed getting the HPV vaccine if eligible.

## 2023-11-10 ENCOUNTER — APPOINTMENT (RX ONLY)
Dept: URBAN - METROPOLITAN AREA CLINIC 86 | Facility: CLINIC | Age: 43
Setting detail: DERMATOLOGY
End: 2023-11-10

## 2023-11-10 DIAGNOSIS — A63.0 ANOGENITAL (VENEREAL) WARTS: ICD-10-CM

## 2023-11-10 PROCEDURE — ? COUNSELING

## 2023-11-10 PROCEDURE — 99213 OFFICE O/P EST LOW 20 MIN: CPT | Mod: 25

## 2023-11-10 PROCEDURE — ? SEPARATE AND IDENTIFIABLE DOCUMENTATION

## 2023-11-10 PROCEDURE — ? LIQUID NITROGEN GENITALS

## 2023-11-10 PROCEDURE — ? TREATMENT REGIMEN

## 2023-11-10 PROCEDURE — ? PRESCRIPTION

## 2023-11-10 PROCEDURE — 17110 DESTRUCTION B9 LES UP TO 14: CPT

## 2023-11-10 RX ORDER — IMIQUIMOD 50 MG/G
CREAM TOPICAL
Qty: 1 | Refills: 5 | Status: ERX

## 2023-11-10 ASSESSMENT — LOCATION ZONE DERM
LOCATION ZONE: TRUNK
LOCATION ZONE: GENITALIA
LOCATION ZONE: LEG

## 2023-11-10 ASSESSMENT — LOCATION SIMPLE DESCRIPTION DERM
LOCATION SIMPLE: GROIN
LOCATION SIMPLE: SCROTUM
LOCATION SIMPLE: RIGHT THIGH

## 2023-11-10 ASSESSMENT — LOCATION DETAILED DESCRIPTION DERM
LOCATION DETAILED: RIGHT ANTERIOR PROXIMAL THIGH
LOCATION DETAILED: RIGHT ANTERIOR SCROTUM
LOCATION DETAILED: SUPRAPUBIC SKIN

## 2023-11-10 NOTE — PROCEDURE: SEPARATE AND IDENTIFIABLE DOCUMENTATION
Detail Level: Detailed Nsaids Counseling: NSAID Counseling: I discussed with the patient that NSAIDs should be taken with food. Prolonged use of NSAIDs can result in the development of stomach ulcers.  Patient advised to stop taking NSAIDs if abdominal pain occurs.  The patient verbalized understanding of the proper use and possible adverse effects of NSAIDs.  All of the patient's questions and concerns were addressed.

## 2023-11-10 NOTE — PROCEDURE: TREATMENT REGIMEN
Detail Level: Zone
Continue Regimen: Imiquimod qod x 2 weeks as needed for any new flares. Pt understood.
Plan: Alternatively, pt can try veregen cream qd x 2 weeks. Also discussed getting the HPV vaccine if eligible.today patient is currently flared he will be continuing imiquimod packets at home.

## 2024-03-26 ENCOUNTER — APPOINTMENT (RX ONLY)
Dept: URBAN - METROPOLITAN AREA CLINIC 86 | Facility: CLINIC | Age: 44
Setting detail: DERMATOLOGY
End: 2024-03-26

## 2024-03-26 VITALS — WEIGHT: 190 LBS | HEIGHT: 74 IN

## 2024-03-26 DIAGNOSIS — A63.0 ANOGENITAL (VENEREAL) WARTS: ICD-10-CM

## 2024-03-26 PROCEDURE — 17110 DESTRUCTION B9 LES UP TO 14: CPT

## 2024-03-26 PROCEDURE — ? TREATMENT REGIMEN

## 2024-03-26 PROCEDURE — ? COUNSELING

## 2024-03-26 PROCEDURE — ? BENIGN DESTRUCTION

## 2024-03-26 ASSESSMENT — LOCATION ZONE DERM: LOCATION ZONE: TRUNK

## 2024-03-26 ASSESSMENT — LOCATION DETAILED DESCRIPTION DERM: LOCATION DETAILED: SUPRAPUBIC SKIN

## 2024-03-26 ASSESSMENT — LOCATION SIMPLE DESCRIPTION DERM: LOCATION SIMPLE: GROIN

## 2024-03-26 NOTE — PROCEDURE: BENIGN DESTRUCTION
Post-Care Instructions: I reviewed with the patient in detail post-care instructions. Patient is to wear sunprotection, and avoid picking at any of the treated lesions. Pt may apply Vaseline to crusted or scabbing areas.\\nPt was informed to wash off cantharidin and/or squaric acid in 30-60min or as otherwise specified.
Anesthesia Volume In Cc: 1
Render Note In Bullet Format When Appropriate: No
Medical Necessity Information: It is in your best interest to select a reason for this procedure from the list below. All of these items fulfill various CMS LCD requirements except the new and changing color options.
Medical Necessity Clause: This procedure was medically necessary because the lesions that were treated were:
Consent: The patient's consent was obtained including but not limited to risks of crusting, scabbing, blistering, scarring, darker or lighter pigmentary change, recurrence, incomplete removal and infection.
Detail Level: Detailed